# Patient Record
Sex: MALE | Race: OTHER | HISPANIC OR LATINO | URBAN - METROPOLITAN AREA
[De-identification: names, ages, dates, MRNs, and addresses within clinical notes are randomized per-mention and may not be internally consistent; named-entity substitution may affect disease eponyms.]

---

## 2018-08-14 ENCOUNTER — EMERGENCY (EMERGENCY)
Facility: HOSPITAL | Age: 43
LOS: 0 days | Discharge: HOME | End: 2018-08-15
Attending: EMERGENCY MEDICINE | Admitting: EMERGENCY MEDICINE

## 2018-08-14 VITALS
WEIGHT: 160.06 LBS | TEMPERATURE: 96 F | HEIGHT: 66 IN | HEART RATE: 72 BPM | OXYGEN SATURATION: 99 % | DIASTOLIC BLOOD PRESSURE: 88 MMHG | SYSTOLIC BLOOD PRESSURE: 141 MMHG | RESPIRATION RATE: 18 BRPM

## 2018-08-14 DIAGNOSIS — R07.9 CHEST PAIN, UNSPECIFIED: ICD-10-CM

## 2018-08-14 DIAGNOSIS — F17.210 NICOTINE DEPENDENCE, CIGARETTES, UNCOMPLICATED: ICD-10-CM

## 2018-08-14 LAB
ANION GAP SERPL CALC-SCNC: 17 MMOL/L — HIGH (ref 7–14)
APTT BLD: 37.4 SEC — SIGNIFICANT CHANGE UP (ref 27–39.2)
BASOPHILS # BLD AUTO: 0.06 K/UL — SIGNIFICANT CHANGE UP (ref 0–0.2)
BASOPHILS NFR BLD AUTO: 0.8 % — SIGNIFICANT CHANGE UP (ref 0–1)
BUN SERPL-MCNC: 14 MG/DL — SIGNIFICANT CHANGE UP (ref 10–20)
CALCIUM SERPL-MCNC: 10.4 MG/DL — HIGH (ref 8.5–10.1)
CHLORIDE SERPL-SCNC: 98 MMOL/L — SIGNIFICANT CHANGE UP (ref 98–110)
CO2 SERPL-SCNC: 29 MMOL/L — SIGNIFICANT CHANGE UP (ref 17–32)
CREAT SERPL-MCNC: 1 MG/DL — SIGNIFICANT CHANGE UP (ref 0.7–1.5)
EOSINOPHIL # BLD AUTO: 0.11 K/UL — SIGNIFICANT CHANGE UP (ref 0–0.7)
EOSINOPHIL NFR BLD AUTO: 1.4 % — SIGNIFICANT CHANGE UP (ref 0–8)
GLUCOSE SERPL-MCNC: 96 MG/DL — SIGNIFICANT CHANGE UP (ref 70–99)
HCT VFR BLD CALC: 42.4 % — SIGNIFICANT CHANGE UP (ref 42–52)
HGB BLD-MCNC: 14.2 G/DL — SIGNIFICANT CHANGE UP (ref 14–18)
IMM GRANULOCYTES NFR BLD AUTO: 0.4 % — HIGH (ref 0.1–0.3)
INR BLD: 1.06 RATIO — SIGNIFICANT CHANGE UP (ref 0.65–1.3)
LYMPHOCYTES # BLD AUTO: 1.89 K/UL — SIGNIFICANT CHANGE UP (ref 1.2–3.4)
LYMPHOCYTES # BLD AUTO: 24.4 % — SIGNIFICANT CHANGE UP (ref 20.5–51.1)
MCHC RBC-ENTMCNC: 27.8 PG — SIGNIFICANT CHANGE UP (ref 27–31)
MCHC RBC-ENTMCNC: 33.5 G/DL — SIGNIFICANT CHANGE UP (ref 32–37)
MCV RBC AUTO: 83.1 FL — SIGNIFICANT CHANGE UP (ref 80–94)
MONOCYTES # BLD AUTO: 0.49 K/UL — SIGNIFICANT CHANGE UP (ref 0.1–0.6)
MONOCYTES NFR BLD AUTO: 6.3 % — SIGNIFICANT CHANGE UP (ref 1.7–9.3)
NEUTROPHILS # BLD AUTO: 5.18 K/UL — SIGNIFICANT CHANGE UP (ref 1.4–6.5)
NEUTROPHILS NFR BLD AUTO: 66.7 % — SIGNIFICANT CHANGE UP (ref 42.2–75.2)
NRBC # BLD: 0 /100 WBCS — SIGNIFICANT CHANGE UP (ref 0–0)
NT-PROBNP SERPL-SCNC: 22 PG/ML — SIGNIFICANT CHANGE UP (ref 0–300)
PLATELET # BLD AUTO: 221 K/UL — SIGNIFICANT CHANGE UP (ref 130–400)
POTASSIUM SERPL-MCNC: 3.9 MMOL/L — SIGNIFICANT CHANGE UP (ref 3.5–5)
POTASSIUM SERPL-SCNC: 3.9 MMOL/L — SIGNIFICANT CHANGE UP (ref 3.5–5)
PROTHROM AB SERPL-ACNC: 11.4 SEC — SIGNIFICANT CHANGE UP (ref 9.95–12.87)
RBC # BLD: 5.1 M/UL — SIGNIFICANT CHANGE UP (ref 4.7–6.1)
RBC # FLD: 12.7 % — SIGNIFICANT CHANGE UP (ref 11.5–14.5)
SODIUM SERPL-SCNC: 144 MMOL/L — SIGNIFICANT CHANGE UP (ref 135–146)
TROPONIN T SERPL-MCNC: <0.01 NG/ML — SIGNIFICANT CHANGE UP
TROPONIN T SERPL-MCNC: <0.01 NG/ML — SIGNIFICANT CHANGE UP
WBC # BLD: 7.76 K/UL — SIGNIFICANT CHANGE UP (ref 4.8–10.8)
WBC # FLD AUTO: 7.76 K/UL — SIGNIFICANT CHANGE UP (ref 4.8–10.8)

## 2018-08-14 RX ORDER — ASPIRIN/CALCIUM CARB/MAGNESIUM 324 MG
325 TABLET ORAL ONCE
Qty: 0 | Refills: 0 | Status: COMPLETED | OUTPATIENT
Start: 2018-08-14 | End: 2018-08-14

## 2018-08-14 RX ADMIN — Medication 325 MILLIGRAM(S): at 16:41

## 2018-08-14 NOTE — ED PROVIDER NOTE - CARE PLAN
Principal Discharge DX:	Chest pain Principal Discharge DX:	Chest pain  Assessment and plan of treatment:	a/p: CP, r/o acs, PERC neg, H&P not c/w pe, dissection, ptx, pna, esoph perf, tamponade. will do labs, ekg/trop, cxr, cardiac monitor, edou for provocative testing.

## 2018-08-14 NOTE — ED PROVIDER NOTE - OBJECTIVE STATEMENT
43M no pmh/meds/all, +smoker 1/2 ppd, p/w 1 mo intermittent L CP. pressure, nonradiating, lasts "2 seconds" present at rest. not exertional. no cp at this time. no sob, palp, n/v, diaphoresis. no fever, cough. no trauma. no tearing sensation or bp. no le edema/pain, immobilization, hormones, hemoptysis. no abd pain, nvdc. no dysuria, freq. no prior cardiac work up or cardiologist. under a lot of stress because broke up with gf. smoking more than usual. no fam hx CAD or sudden death.

## 2018-08-14 NOTE — ED PROVIDER NOTE - NS ED ROS FT
Review of Systems:  	•	CONSTITUTIONAL - No fever, No diaphoresis, No weight change  	•	SKIN - No rash  	•	HEMATOLOGIC - No abnormal bleeding or bruising  	•	EYES - No eye pain, No blurred vision  	•	ENT - No change in hearing, No sore throat, No neck pain, No rhinorrhea, No ear pain  	•	RESPIRATORY - No shortness of breath, No cough  	•	CARDIAC - + chest pain, No palpitations  	•	GI - No abdominal pain, No nausea, No vomiting, No diarrhea, No constipation, No bright red blood per rectum or melena.                      •                 - No dysuria, frequency, hematuria.   	•	ENDO - No polydypsia, No polyuria, No heat/cold intolerance  	•	MUSCULOSKELETAL - No joint paint, No swelling, No back pain  	•	NEUROLOGIC - No numbness, No focal weakness, No headache, No dizziness

## 2018-08-14 NOTE — ED CDU PROVIDER INITIAL DAY NOTE - CHPI ED SYMPTOMS NEG
no back pain/no shortness of breath/no syncope/no vomiting/no diaphoresis/no fever/no dizziness/no cough/no nausea

## 2018-08-14 NOTE — ED CDU PROVIDER INITIAL DAY NOTE - OBJECTIVE STATEMENT
43 y.o. male smoker, no other pmx, no etoh or drug use comes to ed complain of left sided chest tightness x 1 week intermittently. states no prior cardiac workup in the past, states was telling family member and family member brought him to ed for further evaluation. patient states first cousin with cad, mi age 40. no recent trauma, no cough, no fever, no back pain, no dysuria, no hematuria.

## 2018-08-14 NOTE — ED PROVIDER NOTE - PLAN OF CARE
a/p: CP, r/o acs, PERC neg, H&P not c/w pe, dissection, ptx, pna, esoph perf, tamponade. will do labs, ekg/trop, cxr, cardiac monitor, edou for provocative testing.

## 2018-08-14 NOTE — ED CDU PROVIDER INITIAL DAY NOTE - ATTENDING CONTRIBUTION TO CARE
Pt has a history of tobacco use. For the last several days has been having intermittent chest pains lasting only a few seconds. Pt states there are no associated symptoms of nausea, vomiting, diaphoresis or shortness of breath. Does not use any drugs including cocaine. Does physical work. Does not exercise. Low risk CAD. Placed into observation.

## 2018-08-15 VITALS
HEART RATE: 53 BPM | RESPIRATION RATE: 18 BRPM | DIASTOLIC BLOOD PRESSURE: 62 MMHG | SYSTOLIC BLOOD PRESSURE: 97 MMHG | OXYGEN SATURATION: 98 % | TEMPERATURE: 98 F

## 2018-08-15 NOTE — ED CDU PROVIDER DISPOSITION NOTE - CLINICAL COURSE
Pt placed into observation for evaluation of chest pain. While in observation pt was on continuous cardiac  monitoring. Serial cardiac markers neg. Stress test neg. All reports reviewed with pt. All reports given to pt. Noted pt had elevated calcium >>pt advised to follow up with pmd for recheck and evaluation of elevated calcium . Return for any  concerning symptoms. Additionally pt should see Gi to look for alternate causes of chest pain.

## 2018-08-15 NOTE — ED ADULT NURSE REASSESSMENT NOTE - NS ED NURSE REASSESS COMMENT FT1
Patient resting comfortably in bed, no distress noted at this time. Call bell in reach. Will continue to monitor and assess.
continuous cardiac and pulse ox monitoring in place. no complaints. updated with plan of care. alert and oriented x 3, no distress.
continuous cardiac and pulse ox monitoring in place. updated with plan of care. will continue to monitor and assess. alert and oriented x 3, in no distress.
pt came to ED3 from main ER. denies pain/discomfort. right AC #18g intact. pt ambulatory. applied to cardiac monitor.
pt currently resting comfortably. denies pain or discomfort. on cardiac monitor
pt currently resting comfortably. on cardiac monitor. denies pain or discomfort. waiting test in AM
patient resting comfortably, continuous cardiac and pulse ox monitoring in place. updated with plan of care. no complaints. will continue to monitor and assess. family at bedside.

## 2018-08-15 NOTE — ED CDU PROVIDER SUBSEQUENT DAY NOTE - PROGRESS NOTE DETAILS
Patient resting comfortably without complaints, waiting for testing and will continue to monitor patient signed out from roberta cabrera, no complaint comfortable, will continue to monitor

## 2018-08-15 NOTE — ED CDU PROVIDER SUBSEQUENT DAY NOTE - ATTENDING CONTRIBUTION TO CARE
Pt has a history of smoking. Presents with several days of short episode of chest pain. No associated symptoms. Placed into observation for evaluation. s1S2 rrr, lungs clear, abdomen is soft nontender. Ext neg for edema or tenderness.
Hx , exam, testing.

## 2022-12-05 NOTE — ED ADULT NURSE NOTE - PAIN: PRESENCE, MLM
Patient is running short of ALPRAZolam (XANAX) 0.5 MG tablet and wondering if he could get a prescription    Eliecer Salinas   denies pain/discomfort No

## 2024-11-14 NOTE — ED CDU PROVIDER INITIAL DAY NOTE - CPE EDP SKIN NORM
Detail Level: Detailed
Quality 130: Documentation Of Current Medications In The Medical Record: Current Medications Documented
normal...